# Patient Record
Sex: FEMALE | Race: BLACK OR AFRICAN AMERICAN | NOT HISPANIC OR LATINO | Employment: FULL TIME | URBAN - METROPOLITAN AREA
[De-identification: names, ages, dates, MRNs, and addresses within clinical notes are randomized per-mention and may not be internally consistent; named-entity substitution may affect disease eponyms.]

---

## 2022-05-31 ENCOUNTER — TELEPHONE (OUTPATIENT)
Dept: OBSTETRICS AND GYNECOLOGY | Facility: CLINIC | Age: 32
End: 2022-05-31

## 2022-05-31 NOTE — TELEPHONE ENCOUNTER
----- Message from Carmella Ann sent at 5/31/2022  3:33 PM CDT -----  Contact: pt  Type: ER VISIT follow up--would be a new patient    Who Called:  the patient  Best Call Back Number: [phone]  Additional Information: Requesting a call back regarding pt was seen in the ER 5/27/22 was told she had fibroid tumor. Was told by staff to call to see might need surgery.  Please advise as to schedule with Alaina and let pt know thanks.       Please Advise ---Thank you